# Patient Record
Sex: FEMALE | Race: BLACK OR AFRICAN AMERICAN
[De-identification: names, ages, dates, MRNs, and addresses within clinical notes are randomized per-mention and may not be internally consistent; named-entity substitution may affect disease eponyms.]

---

## 2019-02-20 ENCOUNTER — HOSPITAL ENCOUNTER (EMERGENCY)
Dept: HOSPITAL 61 - ER | Age: 19
Discharge: HOME | End: 2019-02-20
Payer: SELF-PAY

## 2019-02-20 VITALS — SYSTOLIC BLOOD PRESSURE: 119 MMHG | DIASTOLIC BLOOD PRESSURE: 77 MMHG

## 2019-02-20 VITALS — WEIGHT: 160 LBS | BODY MASS INDEX: 32.25 KG/M2 | HEIGHT: 59 IN

## 2019-02-20 DIAGNOSIS — N76.0: Primary | ICD-10-CM

## 2019-02-20 DIAGNOSIS — J45.909: ICD-10-CM

## 2019-02-20 DIAGNOSIS — J02.9: ICD-10-CM

## 2019-02-20 DIAGNOSIS — M79.18: ICD-10-CM

## 2019-02-20 DIAGNOSIS — B96.89: ICD-10-CM

## 2019-02-20 DIAGNOSIS — G43.909: ICD-10-CM

## 2019-02-20 DIAGNOSIS — J06.9: ICD-10-CM

## 2019-02-20 DIAGNOSIS — R11.11: ICD-10-CM

## 2019-02-20 LAB
ANION GAP SERPL CALC-SCNC: 13 MMOL/L (ref 6–14)
BASOPHILS # BLD AUTO: 0 X10^3/UL (ref 0–0.2)
BASOPHILS NFR BLD: 1 % (ref 0–3)
BUN SERPL-MCNC: 9 MG/DL (ref 7–20)
CALCIUM SERPL-MCNC: 9.1 MG/DL (ref 8.5–10.1)
CHLORIDE SERPL-SCNC: 102 MMOL/L (ref 98–107)
CO2 SERPL-SCNC: 25 MMOL/L (ref 21–32)
CREAT SERPL-MCNC: 0.9 MG/DL (ref 0.6–1)
EOSINOPHIL NFR BLD: 0.1 X10^3/UL (ref 0–0.7)
EOSINOPHIL NFR BLD: 2 % (ref 0–3)
ERYTHROCYTE [DISTWIDTH] IN BLOOD BY AUTOMATED COUNT: 13.8 % (ref 11.5–14.5)
GFR SERPLBLD BASED ON 1.73 SQ M-ARVRAT: 97.6 ML/MIN
GLUCOSE SERPL-MCNC: 92 MG/DL (ref 70–99)
HCT VFR BLD CALC: 45.2 % (ref 36–47)
HGB BLD-MCNC: 15.2 G/DL (ref 12–15.5)
LYMPHOCYTES # BLD: 2.4 X10^3/UL (ref 1–4.8)
LYMPHOCYTES NFR BLD AUTO: 26 % (ref 24–48)
MCH RBC QN AUTO: 29 PG (ref 25–35)
MCHC RBC AUTO-ENTMCNC: 34 G/DL (ref 31–37)
MCV RBC AUTO: 87 FL (ref 79–100)
MONO #: 0.5 X10^3/UL (ref 0–1.1)
MONOCYTES NFR BLD: 6 % (ref 0–9)
NEUT #: 6 X10^3UL (ref 1.8–7.7)
NEUTROPHILS NFR BLD AUTO: 66 % (ref 31–73)
PLATELET # BLD AUTO: 297 X10^3/UL (ref 140–400)
POTASSIUM SERPL-SCNC: 3.7 MMOL/L (ref 3.5–5.1)
RBC # BLD AUTO: 5.18 X10^6/UL (ref 3.5–5.4)
SODIUM SERPL-SCNC: 140 MMOL/L (ref 136–145)
WBC # BLD AUTO: 9.1 X10^3/UL (ref 4–11)

## 2019-02-20 PROCEDURE — 87070 CULTURE OTHR SPECIMN AEROBIC: CPT

## 2019-02-20 PROCEDURE — 80048 BASIC METABOLIC PNL TOTAL CA: CPT

## 2019-02-20 PROCEDURE — 99284 EMERGENCY DEPT VISIT MOD MDM: CPT

## 2019-02-20 PROCEDURE — 87880 STREP A ASSAY W/OPTIC: CPT

## 2019-02-20 PROCEDURE — 87491 CHLMYD TRACH DNA AMP PROBE: CPT

## 2019-02-20 PROCEDURE — 85025 COMPLETE CBC W/AUTO DIFF WBC: CPT

## 2019-02-20 PROCEDURE — 96372 THER/PROPH/DIAG INJ SC/IM: CPT

## 2019-02-20 PROCEDURE — 99283 EMERGENCY DEPT VISIT LOW MDM: CPT

## 2019-02-20 PROCEDURE — 36415 COLL VENOUS BLD VENIPUNCTURE: CPT

## 2019-02-20 PROCEDURE — 96360 HYDRATION IV INFUSION INIT: CPT

## 2019-02-20 PROCEDURE — 87591 N.GONORRHOEAE DNA AMP PROB: CPT

## 2019-02-20 NOTE — PHYS DOC
Past Medical History


Past Medical History:  Asthma, Migraines


Past Surgical History:  No Surgical History


Alcohol Use:  None


Drug Use:  None





Adult General


Chief Complaint


Chief Complaint:  FLU SYMPTOM





HPI


HPI





Patient is a 18  year old AA female who presents to the emergency room with 

complaints of generalized body aches, sore throat, and cough with posttussive 

emesis intermittently for the last week. Patient states that last night she 

vomited 3 times after coughing. Patient denies any abdominal pain, back pain, 

dysuria, increased urinary frequency, hematuria, or ear pain. Patient states 

that she has had some thick white vaginal discharge and vaginal itching after 

using a new soap. She also reports concerns of a sexually transmitted infection.





Review of Systems


Review of Systems





Constitutional: Reports fever, body aches, fatigue 1 week


Eyes: Denies change in visual acuity, redness, or eye pain []


HENT: Reports nasal congestion and sore throat, denies ear pain


Respiratory: Denies shortness of breath or wheezing; reports dry cough with 

posttussive emesis


Cardiovascular: No additional information not addressed in HPI []


GI: Denies abdominal pain,  or diarrhea; reports posttussive emesis 3 last 

night []


: Denies dysuria or hematuria; reports vaginal itching and irritation with 

thick white discharge after using a new soap recently []


Musculoskeletal: Reports body aches


Integument: Denies rash or skin lesions []


Neurologic: Denies headache, focal weakness or sensory changes []





Current Medications


Current Medications





Current Medications








 Medications


  (Trade)  Dose


 Ordered  Sig/Eduarda  Start Time


 Stop Time Status Last Admin


Dose Admin


 


 Acetaminophen


  (Tylenol)  1,000 mg  1X  ONCE  2/20/19 09:00


 2/20/19 09:01 DC 2/20/19 09:06


1,000 MG


 


 Azithromycin


  (Zithromax)  1,000 mg  1X  ONCE  2/20/19 10:15


 2/20/19 10:16 DC 2/20/19 10:36


1,000 MG


 


 Ceftriaxone Sodium


  (Rocephin Im)  250 mg  1X  ONCE  2/20/19 10:15


 2/20/19 10:16 DC 2/20/19 10:38


250 MG


 


 Sodium Chloride  1,000 ml @ 


 1,000 mls/hr  1X  ONCE  2/20/19 10:15


 2/20/19 11:14 DC 2/20/19 10:05


1,000 MLS/HR











Allergies


Allergies





Allergies








Coded Allergies Type Severity Reaction Last Updated Verified


 


  No Known Drug Allergies    9/1/15 No











Physical Exam


Physical Exam





Constitutional: Well developed, well nourished, no acute distress, non-toxic 

appearance. []


HENT: Normocephalic, atraumatic, bilateral external ears normal, bilateral TMs 

normal, mild erythema of posterior pharynx, oropharynx moist, no oral exudates, 

nose normal. []


Eyes: PERRLA, conjunctiva normal, no discharge. [] 


Neck: Normal range of motion, no tenderness, supple, no stridor. [] 


Cardiovascular:Heart rate regular rhythm, no murmur []


Lungs & Thorax:  Bilateral breath sounds clear to auscultation []


 Pelvic Exam: Chaperone present Sandra PRO


 Abdomen:      Nontender


 External Genitalia:   Normal Skin


 Speculum:      Normal vaginal mucosa, thick white vaginal discharge, non-

friable OS, normal cervical discharge


 Bimanual:       No adnexal masses or tenderness, No CMT


Skin: Warm, dry, no erythema, no rash. [] 


Extremities: No cyanosis, ROM intact, no edema. [] 


Neurologic: Alert and oriented X 3, normal motor function, normal sensory 

function, no focal deficits noted. []


Psychologic: Affect normal, judgement normal, mood normal. []





Current Patient Data


Vital Signs





 Vital Signs








  Date Time  Temp Pulse Resp B/P (MAP) Pulse Ox O2 Delivery O2 Flow Rate FiO2


 


2/20/19 10:30  98  119/77 (91) 99 Room Air  


 


2/20/19 08:43 98.8  18     





 98.8       








Lab Values





 Laboratory Tests








Test


 2/20/19


08:54 2/20/19


10:00


 


Group A Streptococcus Rapid


 Negative


(NEGATIVE) 





 


White Blood Count


 


 9.1 x10^3/uL


(4.0-11.0)


 


Red Blood Count


 


 5.18 x10^6/uL


(3.50-5.40)


 


Hemoglobin


 


 15.2 g/dL


(12.0-15.5)


 


Hematocrit


 


 45.2 %


(36.0-47.0)


 


Mean Corpuscular Volume


 


 87 fL ()





 


Mean Corpuscular Hemoglobin  29 pg (25-35)  


 


Mean Corpuscular Hemoglobin


Concent 


 34 g/dL


(31-37)


 


Red Cell Distribution Width


 


 13.8 %


(11.5-14.5)


 


Platelet Count


 


 297 x10^3/uL


(140-400)


 


Neutrophils (%) (Auto)  66 % (31-73)  


 


Lymphocytes (%) (Auto)  26 % (24-48)  


 


Monocytes (%) (Auto)  6 % (0-9)  


 


Eosinophils (%) (Auto)  2 % (0-3)  


 


Basophils (%) (Auto)  1 % (0-3)  


 


Neutrophils # (Auto)


 


 6.0 x10^3uL


(1.8-7.7)


 


Lymphocytes # (Auto)


 


 2.4 x10^3/uL


(1.0-4.8)


 


Monocytes # (Auto)


 


 0.5 x10^3/uL


(0.0-1.1)


 


Eosinophils # (Auto)


 


 0.1 x10^3/uL


(0.0-0.7)


 


Basophils # (Auto)


 


 0.0 x10^3/uL


(0.0-0.2)


 


Sodium Level


 


 140 mmol/L


(136-145)


 


Potassium Level


 


 3.7 mmol/L


(3.5-5.1)


 


Chloride Level


 


 102 mmol/L


()


 


Carbon Dioxide Level


 


 25 mmol/L


(21-32)


 


Anion Gap  13 (6-14)  


 


Blood Urea Nitrogen


 


 9 mg/dL (7-20)





 


Creatinine


 


 0.9 mg/dL


(0.6-1.0)


 


Estimated GFR


(Cockcroft-Gault) 


 97.6  





 


Glucose Level


 


 92 mg/dL


(70-99)


 


Calcium Level


 


 9.1 mg/dL


(8.5-10.1)





 Laboratory Tests


2/20/19 10:00








 Laboratory Tests


2/20/19 10:00











Microbiology


2/20/19 Wet Prep - Final, Complete





EKG


EKG


[]





Radiology/Procedures


Radiology/Procedures


[]





Course & Med Decision Making


Course & Med Decision Making


Pertinent Labs and Imaging studies reviewed. (See chart for details)


Dx: bacterial vaginosis, vomiting, URI


Patient was treated prophylactically with 250 mg of IM Rocephin, and 1 g of PO 

Zithromax. Patient was instructed to avoid having intercourse until the results 

of gonorrhea and chlamydia testing were available, patient was notified that 

these results would not be available for 48 hours. If one or both of these 

tests is positive, patient needs to refrain from intercourse for approximately 

2 weeks following the treatment of any current partners.  Prescription written 

for flagyl. 


Patient verbalized an understanding of home care, medications, follow-up, and 

return to ED instructions and was in agreement with the plan of care.


[]





Dragon Disclaimer


Dragon Disclaimer


This electronic medical record was generated, in whole or in part, using a 

voice recognition dictation system.





Departure


Departure


Impression:  


 Primary Impression:  


 Vomiting


 Additional Impressions:  


 Bacterial vaginosis


 URI with cough and congestion


Disposition:  01 HOME, SELF-CARE


Condition:  STABLE


Referrals:  


NO PCP (PCP)


Patient Instructions:  Bacterial Vaginosis, Easy-to-Read, Nausea and Vomiting, 

Easy-to-Read, Upper Respiratory Infection, Adult, Easy-to-Read





Additional Instructions:  


Fill the prescription and use as directed. Clear fluids for 24 hours then 

advance to bland foods such as bananas, rice, toast, and applesauce. No 

intercourse until you know the results of your STD testing, you were treated 

prophylactically with zithromax and rocephin in the ER. If one or both of these 

tests are positive you need to refrain from intercourse for two weeks following 

the treatment of any current partners. Follow up with your PCP as needed, 

return to the ER if symptoms worsen.


Scripts


Metronidazole (FLAGYL) 500 Mg Tablet


1 TAB PO BID for 7 Days, #14 TAB 0 Refills


   Prov: MARIELA HATFIELD         2/20/19





Problem Qualifiers








 Primary Impression:  


 Vomiting


 Vomiting type:  unspecified  Vomiting Intractability:  non-intractable  Nausea 

presence:  without nausea  Qualified Codes:  R11.11 - Vomiting without nausea








MARIELA HATFIELD Feb 20, 2019 10:02

## 2019-02-22 NOTE — VNOTE
CALL BACK NOTE


CALL BACK





Microbiology


2/20/19 Wet Prep - Final, Complete


          


2/20/19 Throat Culture - Final, Complete


          


2/20/19 - Final, Complete


          


2/20/19 - Final, Complete


          


Positive for chlamydia, patient was given results, she was treated.











STEFFANIE LOMELI Feb 22, 2019 17:52